# Patient Record
Sex: FEMALE | Race: WHITE | Employment: FULL TIME | URBAN - METROPOLITAN AREA
[De-identification: names, ages, dates, MRNs, and addresses within clinical notes are randomized per-mention and may not be internally consistent; named-entity substitution may affect disease eponyms.]

---

## 2017-09-15 PROCEDURE — 88305 TISSUE EXAM BY PATHOLOGIST: CPT | Performed by: OBSTETRICS & GYNECOLOGY

## 2017-12-05 ENCOUNTER — APPOINTMENT (OUTPATIENT)
Dept: CT IMAGING | Facility: HOSPITAL | Age: 52
End: 2017-12-05
Attending: EMERGENCY MEDICINE
Payer: COMMERCIAL

## 2017-12-05 ENCOUNTER — HOSPITAL ENCOUNTER (EMERGENCY)
Facility: HOSPITAL | Age: 52
Discharge: HOME | End: 2017-12-05
Attending: EMERGENCY MEDICINE
Payer: COMMERCIAL

## 2017-12-05 VITALS
HEART RATE: 70 BPM | OXYGEN SATURATION: 96 % | DIASTOLIC BLOOD PRESSURE: 81 MMHG | TEMPERATURE: 99 F | HEIGHT: 67 IN | SYSTOLIC BLOOD PRESSURE: 115 MMHG | RESPIRATION RATE: 16 BRPM | BODY MASS INDEX: 25.11 KG/M2 | WEIGHT: 160 LBS

## 2017-12-05 DIAGNOSIS — R10.9 ABDOMINAL PAIN OF UNKNOWN ETIOLOGY: Primary | ICD-10-CM

## 2017-12-05 DIAGNOSIS — N83.202 CYST OF LEFT OVARY: ICD-10-CM

## 2017-12-05 PROCEDURE — 99284 EMERGENCY DEPT VISIT MOD MDM: CPT

## 2017-12-05 PROCEDURE — 74176 CT ABD & PELVIS W/O CONTRAST: CPT | Performed by: EMERGENCY MEDICINE

## 2017-12-05 PROCEDURE — 85025 COMPLETE CBC W/AUTO DIFF WBC: CPT | Performed by: EMERGENCY MEDICINE

## 2017-12-05 PROCEDURE — 81001 URINALYSIS AUTO W/SCOPE: CPT | Performed by: EMERGENCY MEDICINE

## 2017-12-05 PROCEDURE — 80053 COMPREHEN METABOLIC PANEL: CPT | Performed by: EMERGENCY MEDICINE

## 2017-12-05 PROCEDURE — 96374 THER/PROPH/DIAG INJ IV PUSH: CPT

## 2017-12-05 RX ORDER — KETOROLAC TROMETHAMINE 30 MG/ML
30 INJECTION, SOLUTION INTRAMUSCULAR; INTRAVENOUS ONCE
Status: COMPLETED | OUTPATIENT
Start: 2017-12-05 | End: 2017-12-05

## 2017-12-05 RX ORDER — KETOROLAC TROMETHAMINE 10 MG/1
10 TABLET, FILM COATED ORAL EVERY 6 HOURS PRN
Qty: 20 TABLET | Refills: 0 | Status: SHIPPED | OUTPATIENT
Start: 2017-12-05 | End: 2017-12-10

## 2017-12-05 NOTE — ED PROVIDER NOTES
Patient Seen in: [facility] Emergency Department    History   Patient presents with:  Abdomen/Flank Pain (GI/)    Stated Complaint: left lower abd pain    HPI    This is a pleasant 62-year-old female presents to the ER complaining of having sharp air)    Current:/81   Pulse 70   Temp 99 °F (37.2 °C) (Temporal)   Resp 16   Ht 170.2 cm (5' 7\")   Wt 72.6 kg   LMP 11/14/2017 (Exact Date)   SpO2 96%   Breastfeeding?  No   BMI 25.06 kg/m²         Physical Exam  General: This a pleasant nontoxic chloé ------                     CBC W/ DIFFERENTIAL[[id number]]          Abnormal            Final result                 Please view results for these tests on the individual orders.    RAINBOW DRAW BLUE   RAINBOW DRAW LAVENDER   RAINBOW DRAW LIGHT GREEN   RAINB medications    Ketorolac Tromethamine 10 MG Oral Tab  Take 1 tablet (10 mg total) by mouth every 6 (six) hours as needed.   Qty: 20 tablet Refills: 0

## 2017-12-05 NOTE — ED INITIAL ASSESSMENT (HPI)
Pt arrives with LLQ sharp abd that woke her from sleep. Pt has hx of ovarian cysts, states pain feels similar. +nausea, denies emesis. LBM yesterday, normal formed stool. Denies CP or SOB. Denies any urinary complaints.  Pt had IUD inserted 2 weeks ago, pt

## 2018-06-21 PROBLEM — K21.9 GASTROESOPHAGEAL REFLUX DISEASE WITHOUT ESOPHAGITIS: Status: ACTIVE | Noted: 2018-06-21

## 2018-06-21 PROBLEM — Z83.79 FAMILY HISTORY OF CELIAC DISEASE: Status: ACTIVE | Noted: 2018-06-21

## 2018-06-21 PROBLEM — K20.0 EOSINOPHILIC ESOPHAGITIS: Status: ACTIVE | Noted: 2018-06-21

## 2020-09-18 ENCOUNTER — HOSPITAL ENCOUNTER (EMERGENCY)
Facility: HOSPITAL | Age: 55
Discharge: HOME | End: 2020-09-18
Attending: EMERGENCY MEDICINE

## 2020-09-18 VITALS
TEMPERATURE: 99 F | HEIGHT: 67 IN | WEIGHT: 132 LBS | DIASTOLIC BLOOD PRESSURE: 70 MMHG | BODY MASS INDEX: 20.72 KG/M2 | OXYGEN SATURATION: 96 % | HEART RATE: 78 BPM | SYSTOLIC BLOOD PRESSURE: 111 MMHG | RESPIRATION RATE: 18 BRPM

## 2020-09-18 DIAGNOSIS — S61.313S: Primary | ICD-10-CM

## 2020-09-18 PROCEDURE — 90471 IMMUNIZATION ADMIN: CPT

## 2020-09-18 PROCEDURE — 99283 EMERGENCY DEPT VISIT LOW MDM: CPT

## 2020-09-18 PROCEDURE — 12001 RPR S/N/AX/GEN/TRNK 2.5CM/<: CPT

## 2020-09-18 RX ORDER — AMOXICILLIN AND CLAVULANATE POTASSIUM 875; 125 MG/1; MG/1
1 TABLET, FILM COATED ORAL 2 TIMES DAILY
Qty: 20 TABLET | Refills: 0 | Status: SHIPPED | OUTPATIENT
Start: 2020-09-18 | End: 2020-09-28

## 2020-09-18 RX ORDER — VENLAFAXINE 75 MG/1
75 TABLET ORAL DAILY
COMMUNITY

## 2020-09-19 NOTE — ED NOTES
2245 - Pt updated regarding delay of room assignment. Pt verbalized understanding and willingness to wait for the assigned room.

## 2020-09-19 NOTE — ED NOTES
3865 - Pt approached this RN at the TL desk complaining why 2 people were brought back ahead of her and that she came ahead of them.  Explained to Pt that Pt's are brought back to the ED based on their acuity or severity, not on the time of arrival.

## 2020-09-19 NOTE — ED PROVIDER NOTES
Patient Seen in: [facility] Emergency Department      History   Patient presents with:  Bite    Stated Complaint: Left 3rd digit dog bite    HPI    This is a 25-year-old female presents ED with complaints of left hand third digit bite.   States that Pupils are equal, round, and reactive to light. Neck:      Musculoskeletal: Normal range of motion and neck supple. Cardiovascular:      Rate and Rhythm: Normal rate and regular rhythm.    Pulmonary:      Effort: Pulmonary effort is normal.      Breath pm    Follow-up:  Genesis Alexandre  [address]  [place] [address] [facility] [address] [phone]    Go in 1 week  For suture removal          Medications Prescribed:  Current Discharge Medication List    START taking these medications    Amoxicillin-Pot Clav

## 2021-02-01 PROBLEM — M19.042 OSTEOARTHRITIS OF BOTH HANDS, UNSPECIFIED OSTEOARTHRITIS TYPE: Status: ACTIVE | Noted: 2021-02-01

## 2021-02-01 PROBLEM — M19.041 OSTEOARTHRITIS OF BOTH HANDS, UNSPECIFIED OSTEOARTHRITIS TYPE: Status: ACTIVE | Noted: 2021-02-01

## 2021-09-27 ENCOUNTER — HOSPITAL ENCOUNTER (OUTPATIENT)
Facility: HOSPITAL | Age: 56
Setting detail: OBSERVATION
Discharge: HOME | End: 2021-09-28
Attending: EMERGENCY MEDICINE | Admitting: HOSPITALIST
Payer: COMMERCIAL

## 2021-09-27 ENCOUNTER — APPOINTMENT (OUTPATIENT)
Dept: GENERAL RADIOLOGY | Facility: HOSPITAL | Age: 56
End: 2021-09-27
Attending: EMERGENCY MEDICINE
Payer: COMMERCIAL

## 2021-09-27 DIAGNOSIS — R07.9 CHEST PAIN, UNSPECIFIED TYPE: Primary | ICD-10-CM

## 2021-09-27 PROBLEM — E87.6 HYPOKALEMIA: Status: ACTIVE | Noted: 2021-09-27

## 2021-09-27 PROBLEM — R79.89 AZOTEMIA: Status: ACTIVE | Noted: 2021-09-27

## 2021-09-27 PROBLEM — D64.9 ANEMIA: Status: ACTIVE | Noted: 2021-09-27

## 2021-09-27 PROCEDURE — 71045 X-RAY EXAM CHEST 1 VIEW: CPT | Performed by: EMERGENCY MEDICINE

## 2021-09-27 PROCEDURE — 99220 INITIAL OBSERVATION CARE,LEVL III: CPT | Performed by: HOSPITALIST

## 2021-09-27 RX ORDER — PROCHLORPERAZINE EDISYLATE 5 MG/ML
5 INJECTION INTRAMUSCULAR; INTRAVENOUS EVERY 8 HOURS PRN
Status: DISCONTINUED | OUTPATIENT
Start: 2021-09-27 | End: 2021-09-28

## 2021-09-27 RX ORDER — DOXEPIN HYDROCHLORIDE 50 MG/1
1 CAPSULE ORAL DAILY
Status: DISCONTINUED | OUTPATIENT
Start: 2021-09-27 | End: 2021-09-28

## 2021-09-27 RX ORDER — ONDANSETRON 2 MG/ML
4 INJECTION INTRAMUSCULAR; INTRAVENOUS EVERY 6 HOURS PRN
Status: DISCONTINUED | OUTPATIENT
Start: 2021-09-27 | End: 2021-09-28

## 2021-09-27 RX ORDER — VENLAFAXINE 75 MG/1
75 TABLET ORAL DAILY
Status: DISCONTINUED | OUTPATIENT
Start: 2021-09-27 | End: 2021-09-28

## 2021-09-27 RX ORDER — ENOXAPARIN SODIUM 100 MG/ML
40 INJECTION SUBCUTANEOUS NIGHTLY
Status: DISCONTINUED | OUTPATIENT
Start: 2021-09-27 | End: 2021-09-28

## 2021-09-27 RX ORDER — HYDROXYCHLOROQUINE SULFATE 200 MG/1
200 TABLET, FILM COATED ORAL DAILY
Status: DISCONTINUED | OUTPATIENT
Start: 2021-09-27 | End: 2021-09-28

## 2021-09-27 RX ORDER — PANTOPRAZOLE SODIUM 40 MG/1
40 TABLET, DELAYED RELEASE ORAL
Status: DISCONTINUED | OUTPATIENT
Start: 2021-09-28 | End: 2021-09-28

## 2021-09-27 RX ORDER — POTASSIUM CHLORIDE 20 MEQ/1
40 TABLET, EXTENDED RELEASE ORAL EVERY 4 HOURS
Status: COMPLETED | OUTPATIENT
Start: 2021-09-27 | End: 2021-09-28

## 2021-09-27 NOTE — ED PROVIDER NOTES
Patient Seen in: [facility] Emergency Department      History   Patient presents with:  Chest Pain Angina    Stated Complaint: cp    Subjective:   HPI    Patient is a 25-year-old female comes to emergency room for evaluation of chest pain.   Patient systems reviewed and negative except as noted above.     Physical Exam     ED Triage Vitals [09/27/21 1608]   /82   Pulse 80   Resp 19   Temp 98.8 °F (37.1 °C)   Temp src Temporal   SpO2 100 %   O2 Device None (Room air)       Current:/89   Puls DIFFERENTIAL[[id number]]          Abnormal            Final result                 Please view results for these tests on the individual orders.    RAINBOW DRAW LAVENDER   RAINBOW DRAW LIGHT GREEN   RAINBOW DRAW GOLD     EKG    Rate, intervals and axes as no 5:14 PM                                  Disposition and Plan     Clinical Impression:  Chest pain, unspecified type  (primary encounter diagnosis)     Disposition:  Admit  9/27/2021  5:14 pm    Follow-up:  No follow-up provider specified.         Medicatio

## 2021-09-27 NOTE — ED QUICK NOTES
Orders for admission, patient is aware of plan and ready to go upstairs. Any questions, please call ED RN art at extension [phone]     Vaccinated?  yes  Type of COVID test sent: rapid  COVID Suspicion level: Low    Titratable drug(s) infusing: none  Rate:

## 2021-09-27 NOTE — ED INITIAL ASSESSMENT (HPI)
Patient c/o sternal CP started 1 1/2 hours ago radiates to left jaw. Currently doesn't have pain right now. Patient sts it's intermittent. Denies SOB.

## 2021-09-27 NOTE — H&P
[facility] HOSPITALIST  History and Physical     Soledad Ackerman Patient Status:  Emergency    1965 MRN [id number]   Location [address] [facility] Attending Sophia Ruby MD   Hosp Day # 0 PCP Donte Juares     Chief Complaint: History   Problem Relation Age of Onset   • Other (SLE and Sjogren's) Sister    • Allergies Daughter    • Allergies Son    • Other (Celiac) Son    • No Known Problems Father    • Cancer Mother         lung        Allergies: No Known Allergies    Medication intact. Musculoskeletal: Moves all extremities. Extremities: No edema or cyanosis. Integument: No rashes or lesions. Psychiatric: Appropriate mood and affect.       Diagnostic Data:      Labs:  Recent Labs   Lab 09/27/21  1557   WBC 5.4   HGB 11.2*   M

## 2021-09-28 ENCOUNTER — APPOINTMENT (OUTPATIENT)
Dept: CV DIAGNOSTICS | Facility: HOSPITAL | Age: 56
End: 2021-09-28
Attending: HOSPITALIST
Payer: COMMERCIAL

## 2021-09-28 VITALS
HEIGHT: 67.5 IN | SYSTOLIC BLOOD PRESSURE: 126 MMHG | OXYGEN SATURATION: 97 % | HEART RATE: 78 BPM | BODY MASS INDEX: 21.28 KG/M2 | DIASTOLIC BLOOD PRESSURE: 71 MMHG | RESPIRATION RATE: 18 BRPM | WEIGHT: 137.19 LBS | TEMPERATURE: 98 F

## 2021-09-28 PROCEDURE — 93306 TTE W/DOPPLER COMPLETE: CPT | Performed by: HOSPITALIST

## 2021-09-28 PROCEDURE — 99217 OBSERVATION CARE DISCHARGE: CPT | Performed by: HOSPITALIST

## 2021-09-28 NOTE — PLAN OF CARE
Received pt from ED at 1. A&Ox4. NSR on tele. On RA. No complaints of CP since admission to floor. Trop (-), trending Q8. Echo in morning.  Cards consult for probable stress test. Pt and  updated on poc and pt resting comfortably in bed  Problem:

## 2021-09-28 NOTE — PROGRESS NOTES
[facility]     Hospitalist Progress Note     Rajeev Burton Patient Status:  Observation    1965 MRN [id number]   [facility] 8NE-A Attending Jorge L Marin MD   Hosp Day # 0 PCP Enriqueta Martinez     Chief Complaint: Chest pain hours.    Imaging: Imaging data reviewed in Epic.     Medications:   • fluticasone furoate  1 puff Inhalation Daily   • Hydroxychloroquine Sulfate  200 mg Oral Daily   • multivitamin  1 tablet Oral Daily   • pantoprazole  40 mg Oral QA AC   • venlafaxine

## 2021-09-28 NOTE — PLAN OF CARE
Patient is aox3, vss, ra, lungs clear, no edema. NSR. Denies any chest pain right now. 2DEcho today.   Problem: Patient/Family Goals  Goal: Patient/Family Long Term Goal  Description: Patient's Long Term Goal: to go home    Interventions:  - echo  -cards t

## 2021-09-28 NOTE — PROGRESS NOTES
09/27/21 1932 09/27/21 1935 09/27/21 1938   Vital Signs   Temp 98.1 °F (36.7 °C)  --   --    Temp src Oral  --   --    Pulse 79 81 85   Heart Rate Source Monitor  --   --    Cardiac Rhythm NSR  --   --    Resp 18 15 23   Respiratory Quality Normal  --

## 2021-09-28 NOTE — CONSULTS
[facility]  Consultation    Yaw Murrell Patient Status:  Observation    1965 MRN [id number]   [facility] 8NE-A Attending Anjum Frank MD   Hosp Day # 0 [facility] [address] [address]    Reason for Consultation:  Chest p Medications:   •  fluticasone furoate (ARNUITY ELLIPTA) 200 MCG/ACT inhaler 1 puff, 1 puff, Inhalation, Daily  •  Hydroxychloroquine Sulfate (PLAQUENIL) tab 200 mg, 200 mg, Oral, Daily  •  multivitamin (ADULT) tab 1 tablet, 1 tablet, Oral, Daily  •  pantop is alert and oriented to person, place, and time. Skin: Skin is warm and dry.             Diagnostics History:  EKG: Normal sinus rhythm no ST-T wave abnormalities  Echo: Normal LVEF no wall motion abnormalities  Stress Test:   Cath:   CTA Chest:   CXR:

## 2021-09-28 NOTE — DISCHARGE SUMMARY
[facility] HOSPITALIST  DISCHARGE SUMMARY     Phillip Morley Patient Status:  Observation    1965 MRN [id number]   [facility] 8NE-A Attending Jonathon Truong MD   Hosp Day # 0 PCP Jennifer Moncada     Date of Admission: 2021  Date of Fluticasone Propionate HFA      2 puffs swallowed (not inhaled) twice a day. Rinse mouth after each use. Quantity: 1 each  Refills: 1     Hydroxychloroquine Sulfate 200 MG Tabs  Commonly known as: PLAQUENIL      Take 200 mg by mouth daily.    Refills: 0

## 2023-07-05 ENCOUNTER — APPOINTMENT (OUTPATIENT)
Dept: GENERAL RADIOLOGY | Facility: HOSPITAL | Age: 58
End: 2023-07-05
Attending: EMERGENCY MEDICINE
Payer: COMMERCIAL

## 2023-07-05 ENCOUNTER — APPOINTMENT (OUTPATIENT)
Dept: GENERAL RADIOLOGY | Facility: HOSPITAL | Age: 58
End: 2023-07-05
Payer: COMMERCIAL

## 2023-07-05 ENCOUNTER — HOSPITAL ENCOUNTER (EMERGENCY)
Facility: HOSPITAL | Age: 58
Discharge: HOME | End: 2023-07-06
Attending: EMERGENCY MEDICINE
Payer: COMMERCIAL

## 2023-07-05 VITALS
HEIGHT: 67 IN | BODY MASS INDEX: 21.97 KG/M2 | HEART RATE: 84 BPM | OXYGEN SATURATION: 97 % | DIASTOLIC BLOOD PRESSURE: 74 MMHG | WEIGHT: 140 LBS | RESPIRATION RATE: 22 BRPM | TEMPERATURE: 98 F | SYSTOLIC BLOOD PRESSURE: 126 MMHG

## 2023-07-05 DIAGNOSIS — S62.605A CLOSED NONDISPLACED FRACTURE OF PHALANX OF LEFT RING FINGER, UNSPECIFIED PHALANX, INITIAL ENCOUNTER: ICD-10-CM

## 2023-07-05 DIAGNOSIS — S63.259A DISLOCATION OF FINGER, INITIAL ENCOUNTER: Primary | ICD-10-CM

## 2023-07-05 DIAGNOSIS — S40.011A CONTUSION OF RIGHT SHOULDER, INITIAL ENCOUNTER: ICD-10-CM

## 2023-07-05 PROCEDURE — 99284 EMERGENCY DEPT VISIT MOD MDM: CPT

## 2023-07-05 PROCEDURE — 26770 TREAT FINGER DISLOCATION: CPT

## 2023-07-05 PROCEDURE — 73030 X-RAY EXAM OF SHOULDER: CPT | Performed by: EMERGENCY MEDICINE

## 2023-07-05 PROCEDURE — 73140 X-RAY EXAM OF FINGER(S): CPT | Performed by: EMERGENCY MEDICINE

## 2023-07-05 PROCEDURE — S0119 ONDANSETRON 4 MG: HCPCS

## 2023-07-05 RX ORDER — ONDANSETRON 4 MG/1
4 TABLET, ORALLY DISINTEGRATING ORAL ONCE
Status: COMPLETED | OUTPATIENT
Start: 2023-07-05 | End: 2023-07-05

## 2023-07-05 RX ORDER — ACETAMINOPHEN AND CODEINE PHOSPHATE 300; 30 MG/1; MG/1
1-2 TABLET ORAL EVERY 6 HOURS PRN
Qty: 12 TABLET | Refills: 0 | Status: SHIPPED | OUTPATIENT
Start: 2023-07-05

## 2023-07-05 RX ORDER — IBUPROFEN 600 MG/1
600 TABLET ORAL ONCE
Status: COMPLETED | OUTPATIENT
Start: 2023-07-05 | End: 2023-07-05

## 2023-07-06 NOTE — DISCHARGE INSTRUCTIONS
Use finger splint and sling at home  Motrin 600 mg every 6 hours for pain at home  Return to the ER if symptoms worsen or if any other problems arise

## 2023-10-26 ENCOUNTER — ORDER TRANSCRIPTION (OUTPATIENT)
Dept: PHYSICAL THERAPY | Facility: HOSPITAL | Age: 58
End: 2023-10-26

## 2023-10-26 DIAGNOSIS — N39.3 FEMALE STRESS INCONTINENCE: Primary | ICD-10-CM

## (undated) NOTE — ED AVS SNAPSHOT
Soledad Ackerman   MRN: [id number]    Department:  [facility] Emergency Department   Date of Visit:  12/5/2017           Disclosure     Insurance plans vary and the physician(s) referred by the ER may not be covered by your plan.  Please contact yo tell this physician (or your personal doctor if your instructions are to return to your personal doctor) about any new or lasting problems. The primary care or specialist physician will see patients referred from the [facility] Emergency Department.  Naeem Taylor